# Patient Record
Sex: FEMALE | Race: WHITE
[De-identification: names, ages, dates, MRNs, and addresses within clinical notes are randomized per-mention and may not be internally consistent; named-entity substitution may affect disease eponyms.]

---

## 2018-10-13 ENCOUNTER — HOSPITAL ENCOUNTER (EMERGENCY)
Dept: HOSPITAL 80 - FED | Age: 23
Discharge: HOME | End: 2018-10-13
Payer: COMMERCIAL

## 2018-10-13 VITALS — SYSTOLIC BLOOD PRESSURE: 111 MMHG | DIASTOLIC BLOOD PRESSURE: 63 MMHG

## 2018-10-13 DIAGNOSIS — B27.90: ICD-10-CM

## 2018-10-13 DIAGNOSIS — J35.1: Primary | ICD-10-CM

## 2018-10-13 DIAGNOSIS — E86.9: ICD-10-CM

## 2018-10-13 NOTE — EDPHY
HPI/HX/ROS/PE/MDM


Narrative: 





CHIEF COMPLAINT: Sore throat, swollen tonsils





HPI: The patient is a 23 y/o female who was recently diagnosed with 

mononucleosis arriving with her mother complaining of a sore throat and swollen 

tonsils. Three weeks ago she developed a sore throat and lightheadedness. 

Initial strep testing was negative. She went to [facility] on Monday

, 5 days ago, and was diagnosed with mono. She has been taking Advil and NyQuil 

for symptoms. She feels her symptoms are worsening and now her tonsils "are 

really swollen to the point where they're gagging me and I want to throw up." 

She's had difficulty sleeping and is scared she won't be able to breathe if the 

swelling worsens, though she denies any current difficulty breathing or 

swallowing. No abdominal pain, vomiting, diarrhea, fever, dyspnea.





REVIEW OF SYSTEMS:


A comprehensive 10 system review of systems is otherwise negative aside from 

elements mentioned in the history of present illness.





PMH: Vocal cord dysfunction 2 years ago with no persisting issues





SOCIAL HISTORY: Mother at bedside. Lives in [place]. PCP: Dr. Oliveira. 





PHYSICAL EXAM:


General:Patient is alert, in no acute distress.


ENT:Eyes are normal to inspection.  ENT inspection shows symmetric bilateral 

tonsillomegaly without exudate, drooling, muffled voice, or stridor.


Neck: Normal inspection.  Full range of motion.


Respiratory:No respiratory distress.  Breath sounds normal bilaterally.


Cardiovascular: Regular rate and rhythm.  Strong peripheral pulses.  Normal cap 

refill.


Abdomen:The abdomen is nontender to palpation. There are no peritoneal signs. 


Back: Normal to inspection.  No tenderness to palpation.


Skin: Normal color.  No rash.  Warm and dry.


Extremities: Normal appearance.  Full range of motion.


Neuro: Oriented x3.  Normal motor function.  Normal sensory function.


ED Course: 





This is a normally healthy 23 y/o female with recent diagnosis of mononucleosis 

complaining of worsening throat pain and swollen tonsils with associated 

nausea. On exam she has bilateral tonsillomegaly without exudate, drooling, 

muffled voice, or stridor. She is afebrile. Plan for treatment with IV, 1L IV NS

, 10mg IM Decadron. 





Reassessed patient. She feels much better. She will be discharged with standard 

supportive care instructions and ENT follow up within 1 week. Return 

precautions discussed. 


MDM: 





I see no signs of PTA, epiglottitis, neck abscess, severe dehydration or airway 

obstruction.





- Data Points


Laboratory Results: 


 











  10/13/18 10/13/18





  Unknown 08:10


 


Group A Strep Screen    NEGATIVE 





    (NEGATIVE) 


 


Group A Strep DNA  Pending   





   











Medications Given: 


 








Discontinued Medications





Dexamethasone (Decadron Injection)  10 mg IVP EDNOW ONE


   Stop: 10/13/18 08:53


   Last Admin: 10/13/18 09:04 Dose:  10 mg


Sodium Chloride (Ns)  1,000 mls @ 0 mls/hr IV EDNOW ONE; Wide Open


   PRN Reason: Protocol


   Stop: 10/13/18 08:52


   Last Admin: 10/13/18 09:04 Dose:  1,000 mls








General


Time Seen by Provider: 10/13/18 08:31


Initial Vital Signs: 


 Initial Vital Signs











Temperature (C)  36.6 C   10/13/18 08:02


 


Heart Rate  101 H  10/13/18 08:02


 


Respiratory Rate  16   10/13/18 08:02


 


Blood Pressure  113/67   10/13/18 08:02


 


O2 Sat (%)  95   10/13/18 08:02








 











O2 Delivery Mode               Room Air














Allergies/Adverse Reactions: 


 





amoxicillin trihydrate [From Augmentin] Allergy (Verified 08/29/15 20:18)


 


potassium clavulanate [From Augmentin] Allergy (Verified 08/29/15 20:18)


 








Home Medications: 














 Medication  Instructions  Recorded


 


NK [No Known Home Meds]  10/13/18














Departure





- Departure


Disposition: Home, Routine, Self-Care


Clinical Impression: 


 Mononucleosis, Tonsillar hypertrophy





Condition: Good


Instructions:  Mononucleosis (ED)


Additional Instructions: 


1. Continue using Tylenol and ibuprofen as directed for symptoms. Many over-the-

counter cold medications contain acetaminophen (the active ingredient in Tylenol

). Be careful not to exceed maximum dosing of acetaminophen. 


2. Other over-the-counter remedies such as hot tea are okay if they are helpful 

for symptoms. 


3. Follow up with ENT if symptoms have not begun to improve over the next week. 

Symptoms from mono can last several weeks. 


4. Return to the ED for difficulty breathing, inability to swallow, or other 

worsening of condition. 





Adult Pain & Fever Control:


We recommend Acetaminophen (Tylenol) and Ibuprofen (Motrin,Advil) for pain and 

fever control.  When fever is high or pain severe, both drugs can be used at 

the same time, but at different intervals.  Please note the time differences.  


Your dose is:     Acetaminophen 650mg every 4 to 6 hours


        Ibuprofen 600mg every 8 hours with food  


Note:  do not take Acetaminophen with Hydrocodone (Vicodin, Lortab) or 

Oxycodone (Percocet).  These medications also contain Acetaminophen.  No more 

than 3000mg of Acetaminophen should be taken in 24 hours (for an adult).


Referrals: 


Jerilyn Oliveira MD [Primary Care Provider] - As per Instructions


Rolf Campoverde MD [Medical Doctor] - As per Instructions


Report Scribed for: Rolf Gallagher


Report Scribed by: Deya Pond


Date of Report: 10/13/18


Time of Report: 08:42


Physician Review and Approval Statement: Portions of this note were transcribed 

by an ED scribe.  I personally performed the history, physical exam, and 

medical decision making; and confirm the accuracy of the information in the 

transcribed note.